# Patient Record
Sex: MALE | Race: WHITE
[De-identification: names, ages, dates, MRNs, and addresses within clinical notes are randomized per-mention and may not be internally consistent; named-entity substitution may affect disease eponyms.]

---

## 2017-10-15 ENCOUNTER — HOSPITAL ENCOUNTER (EMERGENCY)
Dept: HOSPITAL 25 - UCCORT | Age: 37
Discharge: HOME | End: 2017-10-15
Payer: COMMERCIAL

## 2017-10-15 VITALS — DIASTOLIC BLOOD PRESSURE: 78 MMHG | SYSTOLIC BLOOD PRESSURE: 142 MMHG

## 2017-10-15 DIAGNOSIS — J40: Primary | ICD-10-CM

## 2017-10-15 PROCEDURE — G0463 HOSPITAL OUTPT CLINIC VISIT: HCPCS

## 2017-10-15 PROCEDURE — 99212 OFFICE O/P EST SF 10 MIN: CPT

## 2017-10-15 NOTE — UC
Respiratory Complaint HPI





- HPI Summary


HPI Summary: 





pt c/o sudden onset of fever, chills, body aches, nasal congestion, cough, and 

chest congestion and SOB X 4 days. 





- History of Current Complaint


Chief Complaint: UCGeneralIllness


Stated Complaint: COUGH/CONGESTION/FEVER


Time Seen by Provider: 10/15/17 16:23


Hx Obtained From: Patient


Onset/Duration: Sudden Onset, Lasting Days


Timing: Constant


Severity Initially: Mild


Severity Currently: Mild


Character: Cough: Nonproductive


Aggravating Factors: Recumbent Position


Associated Signs And Symptoms: Positive: Fever, Chills, Wheezing, URI, Nasal 

Congestion





- Risk Factors


Pulmonary Embolism Risk Factors: Negative


Cardiac Risk Factors: Negative


Pseudomonas Risk Factors: Negative


Tuberculosis Risk Factors: Negative





- Allergies/Home Medications


Allergies/Adverse Reactions: 


 Allergies











Allergy/AdvReac Type Severity Reaction Status Date / Time


 


No Known Allergies Allergy   Verified 10/15/17 16:17











Home Medications: 


 Home Medications





Aspirin-Acetaminophen-Caffeine [Excedrin Migraine 250-250-65 mg] 2 tab PO ONCE 

PRN 10/15/17 [History Confirmed 10/15/17]


Phenylephrine-Chlorpheniramine [Chen-Strawn Plus Severe 5-2- mg] 1 tab 

PO DAILY PRN 10/15/17 [History Confirmed 10/15/17]











PMH/Surg Hx/FS Hx/Imm Hx


Previously Healthy: Yes





- Surgical History


Surgical History: Yes


Surgery Procedure, Year, and Place: RIGHT HAND , INDEX FINGER AMPUTATION- FROM 

INJURY





- Family History


Known Family History: Positive: Cardiac Disease





- Social History


Occupation: Employed Full-time


Lives: With Family


Alcohol Use: Occasionally


Substance Use Type: None


Smoking Status (MU): Never Smoked Tobacco


Have You Smoked in the Last Year: No





Review of Systems


Constitutional: Fever, Chills, Fatigue


Skin: Negative


Eyes: Negative


ENT: Other - nasal congestion, chest congestion


Respiratory: Cough


Cardiovascular: Negative


Gastrointestinal: Negative


Genitourinary: Negative


Motor: Negative


Neurovascular: Negative


Musculoskeletal: Negative


Neurological: Negative


Psychological: Negative


Is Patient Immunocompromised?: No


All Other Systems Reviewed And Are Negative: Yes





Physical Exam


Triage Information Reviewed: Yes


Appearance: Ill-Appearing


Vital Signs: 


 Initial Vital Signs











Temp  98.9 F   10/15/17 16:09


 


Pulse  68   10/15/17 16:09


 


Resp  16   10/15/17 16:09


 


BP  142/78   10/15/17 16:09


 


Pulse Ox  98   10/15/17 16:09











Vital Signs Reviewed: Yes


Eye Exam: Normal


ENT: Positive: Nasal congestion


Dental Exam: Normal


Neck exam: Normal


Respiratory Exam: Other


Respiratory: Positive: Wheezing


Cardiovascular Exam: Normal


Musculoskeletal Exam: Normal


Neurological Exam: Normal


Psychological Exam: Normal


Skin Exam: Normal





UC Diagnostic Evaluation





- Laboratory


O2 Sat by Pulse Oximetry: 98





Respiratory Course/Dx





- Differential Dx/Diagnosis


Differential Diagnosis/HQI/PQRI: Bronchitis, Sinusitis


Provider Diagnoses: bronchitis





Discharge





- Discharge Plan


Condition: Stable


Disposition: HOME


Prescriptions: 


Azithromycin TAB* [Zithromax TAB (Z-SHILA) 250 mg #6 tabs] 2 tab PO .TODAY, THEN 

1 DAILY #1 shila


Benzonatate CAP* [Tessalon 100 MG CAP*] 100 mg PO Q8H PRN #15 cap


 PRN Reason: Cough


predniSONE TAB* [Deltasone TAB*] 20 mg PO DAILY #4 tab


Patient Education Materials:  Acute Bronchitis (ED)


Referrals: 


Sydnie Salgado MD [Primary Care Provider] - If Needed

## 2019-03-12 ENCOUNTER — HOSPITAL ENCOUNTER (EMERGENCY)
Dept: HOSPITAL 25 - UCCORT | Age: 39
Discharge: LEFT BEFORE DISCHARGE | End: 2019-03-12
Payer: COMMERCIAL

## 2019-03-12 DIAGNOSIS — Z53.21: Primary | ICD-10-CM
